# Patient Record
Sex: MALE | Race: WHITE | URBAN - METROPOLITAN AREA
[De-identification: names, ages, dates, MRNs, and addresses within clinical notes are randomized per-mention and may not be internally consistent; named-entity substitution may affect disease eponyms.]

---

## 2018-03-16 ENCOUNTER — OFFICE VISIT (OUTPATIENT)
Dept: FAMILY MEDICINE | Facility: CLINIC | Age: 23
End: 2018-03-16
Payer: COMMERCIAL

## 2018-03-16 VITALS
DIASTOLIC BLOOD PRESSURE: 80 MMHG | RESPIRATION RATE: 18 BRPM | OXYGEN SATURATION: 100 % | SYSTOLIC BLOOD PRESSURE: 125 MMHG | WEIGHT: 168.25 LBS | BODY MASS INDEX: 24.09 KG/M2 | HEART RATE: 92 BPM | TEMPERATURE: 97.4 F | HEIGHT: 70 IN

## 2018-03-16 DIAGNOSIS — F41.8 SITUATIONAL ANXIETY: ICD-10-CM

## 2018-03-16 PROCEDURE — 99213 OFFICE O/P EST LOW 20 MIN: CPT | Performed by: FAMILY MEDICINE

## 2018-03-16 RX ORDER — PROPRANOLOL HYDROCHLORIDE 10 MG/1
TABLET ORAL
Qty: 30 TABLET | Refills: 0 | Status: SHIPPED | OUTPATIENT
Start: 2018-03-16

## 2018-03-16 NOTE — MR AVS SNAPSHOT
"              After Visit Summary   3/16/2018    Shan Stinson    MRN: [id number]           Patient Information     Date Of Birth          1995        Visit Information        Provider Department      3/16/2018 11:20 AM Basia Guy MD [facility]        Today's Diagnoses     Situational anxiety           Follow-ups after your visit        Follow-up notes from your care team     Return if symptoms worsen or fail to improve.      Who to contact     If you have questions or need follow up information about today's clinic visit or your schedule please contact [facility] directly at [phone].  Normal or non-critical lab and imaging results will be communicated to you by MyChart, letter or phone within 4 business days after the clinic has received the results. If you do not hear from us within 7 days, please contact the clinic through MyChart or phone. If you have a critical or abnormal lab result, we will notify you by phone as soon as possible.  Submit refill requests through c-crowd or call your pharmacy and they will forward the refill request to us. Please allow 3 business days for your refill to be completed.          Additional Information About Your Visit        MyChart Information     [employer]-crowd lets you send messages to your doctor, view your test results, renew your prescriptions, schedule appointments and more. To sign up, go to [email or web address] . Click on \"Log in\" on the left side of the screen, which will take you to the Welcome page. Then click on \"Sign up Now\" on the right side of the page.     You will be asked to enter the access code listed below, as well as some personal information. Please follow the directions to create your username and password.     Your access code is: [email or web address]  Expires: 2018 11:39 AM     Your access code will  in 90 days. If you need help or a new code, please call your [place] clinic " "or [phone].        Care EveryWhere ID     This is your Care EveryWhere ID. This could be used by other organizations to access your [facility] medical records  [id number]        Your Vitals Were     Pulse Temperature Respirations Height Pulse Oximetry BMI (Body Mass Index)    92 97.4  F (36.3  C) (Oral) 18 5' 10\" (1.778 m) 100% 24.14 kg/m2       Blood Pressure from Last 3 Encounters:   03/16/18 125/80   02/05/15 136/80   10/27/05 105/60    Weight from Last 3 Encounters:   03/16/18 168 lb 4 oz (76.3 kg)   02/05/15 166 lb 3.2 oz (75.4 kg)   06/18/07 101 lb 12.8 oz (46.2 kg) (48 %)*     * Growth percentiles are based on [facility] 2-20 Years data.              Today, you had the following     No orders found for display         Where to get your medicines      These medications were sent to [facility] - [place], [place] - [address]  [address], [place] [address]     Phone:  [phone]     propranolol 10 MG tablet          Primary Care Provider Office Phone # Fax #    Jayme Stephens [phone]       [address] [place], [place] [id number]        Equal Access to Services     LUZ ABERNATHY : Hadii darlene carter hadasho Soomaali, waaxda luqadaha, qaybta kaalmada adeандрей, magy wisdom. So [facility] [phone].    ATENCIÓN: Si habla español, tiene a gastelum disposición servicios gratuitos de asistencia lingüística. Llwilbert al [phone].    We comply with applicable federal civil rights laws and [place] laws. We do not discriminate on the basis of race, color, national origin, age, disability, sex, sexual orientation, or gender identity.            Thank you!     Thank you for choosing [facility]  for your care. Our goal is always to provide you with excellent care. Hearing back from our patients is one way we can continue to improve our services. Please take a few minutes to complete the written survey that you may receive in the mail after your " visit with us. Thank you!             Your Updated Medication List - Protect others around you: Learn how to safely use, store and throw away your medicines at www.disposemymeds.org.          This list is accurate as of 3/16/18 11:39 AM.  Always use your most recent med list.                   Brand Name Dispense Instructions for use Diagnosis    NO ACTIVE MEDICATIONS      .        propranolol 10 MG tablet    INDERAL    30 tablet    Take one tablet 30-60 minutes before a presentation/interview/situational anxiety    Situational anxiety

## 2018-03-16 NOTE — PROGRESS NOTES
SUBJECTIVE:   Shan Stinson is a 22 year old male who presents to clinic today for the following health issues:    Hx of situational anxiety when interviewing.  Used Inderal two years ago with good results when was graduating from college and interviewing for jobs.  He now is interviewing again for a new job and would like to have a new rx for Inderal again.    Otherwise is healthy.  Works as .  Hoping to move to [place].    Problem list and histories reviewed & adjusted, as indicated.  Additional history: as documented    Patient Active Problem List   Diagnosis     Situational anxiety     History reviewed. No pertinent surgical history.    Social History   Substance Use Topics     Smoking status: Former Smoker     Smokeless tobacco: Never Used     Alcohol use Yes      Comment: 12 drinks weekly     Family History   Problem Relation Age of Onset     No Known Problems Mother      No Known Problems Father      No Known Problems Sister      Family History Negative No family hx of          Current Outpatient Prescriptions   Medication Sig Dispense Refill     propranolol (INDERAL) 10 MG tablet Take one tablet 30-60 minutes before a presentation/interview/situational anxiety 30 tablet 3     NO ACTIVE MEDICATIONS .       No Known Allergies  BP Readings from Last 3 Encounters:   03/16/18 125/80   02/05/15 136/80   10/27/05 105/60    Wt Readings from Last 3 Encounters:   03/16/18 168 lb 4 oz (76.3 kg)   02/05/15 166 lb 3.2 oz (75.4 kg)   06/18/07 101 lb 12.8 oz (46.2 kg) (48 %)*     * Growth percentiles are based on [facility] 2-20 Years data.                    Reviewed and updated as needed this visit by clinical staff  Tobacco  Allergies  Meds  Med Hx  Surg Hx  Fam Hx  Soc Hx      Reviewed and updated as needed this visit by Provider         ROS:  Constitutional, HEENT, cardiovascular, pulmonary, gi and gu systems are negative, except as otherwise noted.    OBJECTIVE:     /80  Pulse 92  Temp 97.4  " F (36.3  C) (Oral)  Resp 18  Ht 5' 10\" (1.778 m)  Wt 168 lb 4 oz (76.3 kg)  SpO2 100%  BMI 24.14 kg/m2  Body mass index is 24.14 kg/(m^2).  GENERAL: healthy, alert and no distress  EYES: Eyes grossly normal to inspection, PERRL and conjunctivae and sclerae normal  NECK: no adenopathy, no asymmetry, masses, or scars and thyroid normal to palpation  MS: no gross musculoskeletal defects noted, no edema  SKIN: no suspicious lesions or rashes  PSYCH: mentation appears normal, affect normal/bright    Diagnostic Test Results:  none     ASSESSMENT/PLAN:     1. Situational anxiety    - propranolol (INDERAL) 10 MG tablet; Take one tablet 30-60 minutes before a presentation/interview/situational anxiety  Dispense: 30 tablet; Refill: 0    Refill of Inderal for interviews prn.  FU prn.    Basia Guy MD  [facility]  "